# Patient Record
Sex: MALE | Race: WHITE
[De-identification: names, ages, dates, MRNs, and addresses within clinical notes are randomized per-mention and may not be internally consistent; named-entity substitution may affect disease eponyms.]

---

## 2019-09-16 ENCOUNTER — HOSPITAL ENCOUNTER (EMERGENCY)
Dept: HOSPITAL 56 - MW.ED | Age: 39
Discharge: HOME | End: 2019-09-16
Payer: COMMERCIAL

## 2019-09-16 DIAGNOSIS — L03.115: ICD-10-CM

## 2019-09-16 DIAGNOSIS — I80.01: Primary | ICD-10-CM

## 2019-09-16 LAB
BUN SERPL-MCNC: 16 MG/DL (ref 7–18)
CHLORIDE SERPL-SCNC: 104 MMOL/L (ref 98–107)
CO2 SERPL-SCNC: 26.8 MMOL/L (ref 21–32)
GLUCOSE SERPL-MCNC: 97 MG/DL (ref 74–106)
POTASSIUM SERPL-SCNC: 3.8 MMOL/L (ref 3.5–5.1)
SODIUM SERPL-SCNC: 140 MMOL/L (ref 136–148)

## 2019-09-16 NOTE — US
Right lower extremity deep venous ultrasound: Duplex and color flow imaging was 
obtained of the right common femoral, proximal greater saphenous, superficial 
femoral, popliteal and calf veins.



Findings: Deep vein show normal phasic flow, augmentation and compression.  
There is a varicosity within the right calf near the ankle showing evidence of 
thrombus compatible with superficial thrombophlebitis.  Adjacent subcutaneous 
edema is also noted in this area.



Impression:

1.  Findings compatible with superficial thrombophlebitis with adjacent 
subcutaneous edema as noted above.

2.  No evidence of deep venous thrombosis within the right lower extremity.



Diagnostic code #3
MTDD

## 2019-09-16 NOTE — EDM.PDOC
ED HPI GENERAL MEDICAL PROBLEM





- General


Chief Complaint: Lower Extremity Injury/Pain


Stated Complaint: RIGHT SWOLLEN CALF


Time Seen by Provider: 09/16/19 10:12


Source of Information: Reports: Patient


History Limitations: Reports: No Limitations





- History of Present Illness


INITIAL COMMENTS - FREE TEXT/NARRATIVE: 


HISTORY AND PHYSICAL:





History of present illness:


Patient is a 39-year-old male presents to the ED today with concern of his 

right calf being red, swollen, and painful 3 days. Patient states he did have 

a blood clot when he was 18 which she states caused a "stroke". Patient states 

he has no idea why he had a blood clot at a young age and states he's never 

been told anything more than that. Patient states starting 3 days ago he began 

to have swelling and redness of his right calf. Patient states he does have a 

"knot" in his calf area which is more point tenderness. Patient denies any 

trauma or injury to the area or any break in the skin. Patient does smoke a 

pack a day and has so for 20 years. Patient denies any other health history. 

Patient denies any shortness of breath, hemoptysis, or pain with inspiration.


. 


Patient denies fever, chills, chest pain, shortness of breath, or cough. Denies 

headache, neck stiff ness, change in vision, syncope, or near syncope. Denies 

nausea, vomiting, abdominal pain, diarrhea, constipation, or dysuria. Has not 

noted any blood in urine or stool. Patient has been eating and drinking 

appropriately.





Review of systems: 


As per history of present illness and below otherwise all systems reviewed and 

negative.





Past medical history: 


As per history of present illness and as reviewed below otherwise 

noncontributory.





Surgical history: 


As per history of present illness and as reviewed below otherwise 

noncontributory.





Social history: 


See social history for further information





Family history: 


As per history of present illness and as reviewed below otherwise 

noncontributory.





Physical exam:


General: Patient is alert, oriented, and in no acute distress. Patient sitting 

comfortably on exam table.


HEENT: Atraumatic, normocephalic, pupils equal and reactive bilaterally, 

negative for conjunctival pallor or scleral icterus, mucous membranes moist, 

TMs normal bilaterally, throat clear, neck supple, nontender, trachea midline. 

No drooling or trismus noted. No meningeal signs. No hot potato voice noted. 


Lungs: Clear to auscultation, breath sounds equal bilaterally, chest nontender.


Heart: S1S2, regular rate and rhythm without overt murmur


Abdomen: Soft, nondistended, nontender. Negative for masses or 

hepatosplenomegaly. Negative for costovertebral tenderness.


Pelvis: Stable nontender.


Genitourinary: Deferred.


Rectal: Deferred.


Skin: Intact, warm, dry. No lesions or rashes noted.


Extremities: Atraumatic, negative for cords or calf pain. Neurovascular 

unremarkable. Right calf is streaky erythematous, 1+ non pitting edema, and 

warm to the touch. Mild discomfort with palpation and negative Homans sign. 

Dorsalis pedis and posterior tibial pulses are grossly intact and capillary 

refill less than 2 seconds of the RLE.


Neuro: Awake, alert, oriented. Cranial nerves II through XII unremarkable. 

Cerebellum unremarkable. Motor and sensory unremarkable throughout. Exam 

nonfocal.





Notes:


Radiology is reading this area as a superficial thrombophlebitis, however, will 

give antibiotic at this time in case of cellulitis. Discussed with patient the 

importance for follow-up with primary care provider and to return to the ED if 

symptoms worsen.


Voices understanding and is agreeable to plan of care. Denies any further 

questions or concerns at this time.





Diagnostics:


CBC, CMP, PT/INR, PTT, right lower extremity Doppler ultrasound





Therapeutics:


None





Prescription:


Bactrim DS





Impression:


Superficial thrombophlebitis vs cellulitis





Plan:


1. Take medication as prescribed. You can alternate ibuprofen and Tylenol as 

directed for pain and discomfort.


2. Follow-up with your primary care provider as discussed. Return to the ED as 

needed and as discussed.





Definitive disposition and diagnosis as appropriate pending reevaluation and 

review of above.





  ** Right Calf


Pain Score (Numeric/FACES): 2





- Related Data


 Allergies











Allergy/AdvReac Type Severity Reaction Status Date / Time


 


No Known Allergies Allergy   Verified 09/16/19 10:24











Home Meds: 


 Home Meds





. [No Known Home Meds]  09/16/19 [History]











Review of Systems





- Review of Systems


Review Of Systems: ROS reveals no pertinent complaints other than HPI.





ED EXAM, GENERAL





- Physical Exam


Exam: See Below (See dictation)





Course





- Vital Signs


Last Recorded V/S: 


 Last Vital Signs











Temp  37.1 C   09/16/19 10:26


 


Pulse  77   09/16/19 10:26


 


Resp  14   09/16/19 10:26


 


BP  130/70   09/16/19 10:26


 


Pulse Ox  96   09/16/19 10:26














- Orders/Labs/Meds


Labs: 


 Laboratory Tests











  09/16/19 09/16/19 09/16/19 Range/Units





  10:51 10:51 10:51 


 


WBC  9.95    (4.0-11.0)  K/uL


 


RBC  4.52    (4.50-5.90)  M/uL


 


Hgb  14.2    (13.0-17.0)  g/dL


 


Hct  40.7    (38.0-50.0)  %


 


MCV  90.0    (80.0-98.0)  fL


 


MCH  31.4    (27.0-32.0)  pg


 


MCHC  34.9    (31.0-37.0)  g/dL


 


RDW Std Deviation  43.5    (28.0-62.0)  fl


 


RDW Coeff of Agatha  13    (11.0-15.0)  %


 


Plt Count  206    (150-400)  K/uL


 


MPV  10.10    (7.40-12.00)  fL


 


Neut % (Auto)  57.5    (48.0-80.0)  %


 


Lymph % (Auto)  29.6    (16.0-40.0)  %


 


Mono % (Auto)  10.8    (0.0-15.0)  %


 


Eos % (Auto)  1.9    (0.0-7.0)  %


 


Baso % (Auto)  0.2    (0.0-1.5)  %


 


Neut # (Auto)  5.7    (1.4-5.7)  K/uL


 


Lymph # (Auto)  3.0 H    (0.6-2.4)  K/uL


 


Mono # (Auto)  1.1 H    (0.0-0.8)  K/uL


 


Eos # (Auto)  0.2    (0.0-0.7)  K/uL


 


Baso # (Auto)  0.0    (0.0-0.1)  K/uL


 


Nucleated RBC %  0.0    /100WBC


 


Nucleated RBCs #  0    K/uL


 


INR   0.96   


 


APTT     (18.6-31.3)  SEC


 


Sodium    140  (136-148)  mmol/L


 


Potassium    3.8  (3.5-5.1)  mmol/L


 


Chloride    104  ()  mmol/L


 


Carbon Dioxide    26.8  (21.0-32.0)  mmol/L


 


BUN    16  (7.0-18.0)  mg/dL


 


Creatinine    1.3  (0.8-1.3)  mg/dL


 


Est Cr Clr Drug Dosing    77.80  mL/min


 


Estimated GFR (MDRD)    > 60.0  ml/min


 


Glucose    97  ()  mg/dL


 


Calcium    9.5  (8.5-10.1)  mg/dL


 


Total Bilirubin    0.4  (0.2-1.0)  mg/dL


 


AST    15  (15-37)  IU/L


 


ALT    23  (14-63)  IU/L


 


Alkaline Phosphatase    88  ()  U/L


 


Total Protein    7.0  (6.4-8.2)  g/dL


 


Albumin    3.8  (3.4-5.0)  g/dL


 


Globulin    3.2  (2.6-4.0)  g/dL


 


Albumin/Globulin Ratio    1.2  (0.9-1.6)  














  09/16/19 Range/Units





  10:51 


 


WBC   (4.0-11.0)  K/uL


 


RBC   (4.50-5.90)  M/uL


 


Hgb   (13.0-17.0)  g/dL


 


Hct   (38.0-50.0)  %


 


MCV   (80.0-98.0)  fL


 


MCH   (27.0-32.0)  pg


 


MCHC   (31.0-37.0)  g/dL


 


RDW Std Deviation   (28.0-62.0)  fl


 


RDW Coeff of Agatha   (11.0-15.0)  %


 


Plt Count   (150-400)  K/uL


 


MPV   (7.40-12.00)  fL


 


Neut % (Auto)   (48.0-80.0)  %


 


Lymph % (Auto)   (16.0-40.0)  %


 


Mono % (Auto)   (0.0-15.0)  %


 


Eos % (Auto)   (0.0-7.0)  %


 


Baso % (Auto)   (0.0-1.5)  %


 


Neut # (Auto)   (1.4-5.7)  K/uL


 


Lymph # (Auto)   (0.6-2.4)  K/uL


 


Mono # (Auto)   (0.0-0.8)  K/uL


 


Eos # (Auto)   (0.0-0.7)  K/uL


 


Baso # (Auto)   (0.0-0.1)  K/uL


 


Nucleated RBC %   /100WBC


 


Nucleated RBCs #   K/uL


 


INR   


 


APTT  29.5  (18.6-31.3)  SEC


 


Sodium   (136-148)  mmol/L


 


Potassium   (3.5-5.1)  mmol/L


 


Chloride   ()  mmol/L


 


Carbon Dioxide   (21.0-32.0)  mmol/L


 


BUN   (7.0-18.0)  mg/dL


 


Creatinine   (0.8-1.3)  mg/dL


 


Est Cr Clr Drug Dosing   mL/min


 


Estimated GFR (MDRD)   ml/min


 


Glucose   ()  mg/dL


 


Calcium   (8.5-10.1)  mg/dL


 


Total Bilirubin   (0.2-1.0)  mg/dL


 


AST   (15-37)  IU/L


 


ALT   (14-63)  IU/L


 


Alkaline Phosphatase   ()  U/L


 


Total Protein   (6.4-8.2)  g/dL


 


Albumin   (3.4-5.0)  g/dL


 


Globulin   (2.6-4.0)  g/dL


 


Albumin/Globulin Ratio   (0.9-1.6)  














Departure





- Departure


Time of Disposition: 11:35


Disposition: Home, Self-Care 01


Clinical Impression: 


Superficial thrombophlebitis of lower extremity


Qualifiers:


 Laterality: right Qualified Code(s): I80.01 - Phlebitis and thrombophlebitis 

of superficial vessels of right lower extremity





Cellulitis


Qualifiers:


 Site of cellulitis: extremity Site of cellulitis of extremity: lower extremity 

Laterality: right Qualified Code(s): L03.115 - Cellulitis of right lower limb








- Discharge Information


Referrals: 


PCP,None [Primary Care Provider] - 


Forms:  ED Department Discharge


Additional Instructions: 


The following information is given to patients seen in the emergency department 

who are being discharged to home. This information is to outline your options 

for follow-up care. We provide all patients seen in our emergency department 

with a follow-up referral.





The need for follow-up, as well as the timing and circumstances, are variable 

depending upon the specifics of your emergency department visit.





If you don't have a primary care physician on staff, we will provide you with a 

referral. We always advise you to contact your personal physician following an 

emergency department visit to inform them of the circumstance of the visit and 

for follow-up with them and/or the need for any referrals to a consulting 

specialist.





The emergency department will also refer you to a specialist when appropriate. 

This referral assures that you have the opportunity for follow-up care with a 

specialist. All of these measure are taken in an effort to provide you with 

optimal care, which includes your follow-up.





Under all circumstances we always encourage you to contact your private 

physician who remains a resource for coordinating your care. When calling for 

follow-up care, please make the office aware that this follow-up is from your 

recent emergency room visit. If for any reason you are refused follow-up, 

please contact the [facility] Emergency 

Department at [phone] and asked to speak to the emergency department 

charge nurse.





[facility]


Primary Care


[address] [place] [id number]


Phone: [phone]


Fax: [phone]





[address] [address]


Phone: [phone]


Fax: [phone]





1. Take medication as prescribed. You can alternate ibuprofen and Tylenol as 

directed for pain and discomfort.


2. Follow-up with your primary care provider as discussed. Return to the ED as 

needed and as discussed.

## 2023-03-18 ENCOUNTER — HOSPITAL ENCOUNTER (EMERGENCY)
Dept: HOSPITAL 41 - JD.ED | Age: 43
LOS: 1 days | Discharge: HOME | End: 2023-03-19
Payer: SELF-PAY

## 2023-03-18 DIAGNOSIS — F17.210: ICD-10-CM

## 2023-03-18 DIAGNOSIS — M62.830: Primary | ICD-10-CM

## 2023-03-18 DIAGNOSIS — Z86.16: ICD-10-CM

## 2023-03-18 PROCEDURE — 96361 HYDRATE IV INFUSION ADD-ON: CPT

## 2023-03-18 PROCEDURE — 96374 THER/PROPH/DIAG INJ IV PUSH: CPT

## 2023-03-18 PROCEDURE — 74176 CT ABD & PELVIS W/O CONTRAST: CPT

## 2023-03-18 PROCEDURE — 99284 EMERGENCY DEPT VISIT MOD MDM: CPT

## 2023-03-18 PROCEDURE — 87086 URINE CULTURE/COLONY COUNT: CPT

## 2023-03-18 PROCEDURE — 96375 TX/PRO/DX INJ NEW DRUG ADDON: CPT

## 2023-03-18 PROCEDURE — 81001 URINALYSIS AUTO W/SCOPE: CPT
